# Patient Record
Sex: FEMALE | Race: WHITE | NOT HISPANIC OR LATINO | URBAN - METROPOLITAN AREA
[De-identification: names, ages, dates, MRNs, and addresses within clinical notes are randomized per-mention and may not be internally consistent; named-entity substitution may affect disease eponyms.]

---

## 2023-05-13 ENCOUNTER — HOSPITAL ENCOUNTER (EMERGENCY)
Facility: HOSPITAL | Age: 73
Discharge: HOME | End: 2023-05-13
Attending: EMERGENCY MEDICINE
Payer: MEDICARE

## 2023-05-13 ENCOUNTER — APPOINTMENT (OUTPATIENT)
Dept: CT IMAGING | Facility: HOSPITAL | Age: 73
End: 2023-05-13
Payer: MEDICARE

## 2023-05-13 ENCOUNTER — APPOINTMENT (OUTPATIENT)
Dept: GENERAL RADIOLOGY | Facility: HOSPITAL | Age: 73
End: 2023-05-13
Payer: MEDICARE

## 2023-05-13 VITALS
SYSTOLIC BLOOD PRESSURE: 137 MMHG | WEIGHT: 214 LBS | HEART RATE: 101 BPM | HEIGHT: 64 IN | OXYGEN SATURATION: 95 % | DIASTOLIC BLOOD PRESSURE: 75 MMHG | BODY MASS INDEX: 36.54 KG/M2 | RESPIRATION RATE: 22 BRPM | TEMPERATURE: 98.2 F

## 2023-05-13 DIAGNOSIS — R06.01 ORTHOPNEA: Primary | ICD-10-CM

## 2023-05-13 DIAGNOSIS — R06.02 SHORTNESS OF BREATH: ICD-10-CM

## 2023-05-13 DIAGNOSIS — R05.1 ACUTE COUGH: ICD-10-CM

## 2023-05-13 DIAGNOSIS — I42.0 DILATED CARDIOMYOPATHY: ICD-10-CM

## 2023-05-13 DIAGNOSIS — I44.7 LEFT BUNDLE BRANCH BLOCK (LBBB): ICD-10-CM

## 2023-05-13 DIAGNOSIS — K02.9 DENTAL CARIES: ICD-10-CM

## 2023-05-13 DIAGNOSIS — J32.0 LEFT MAXILLARY SINUSITIS: ICD-10-CM

## 2023-05-13 PROBLEM — J20.9 ACUTE BRONCHITIS: Status: ACTIVE | Noted: 2023-05-13

## 2023-05-13 PROBLEM — I48.92 ATRIAL FLUTTER: Status: ACTIVE | Noted: 2023-05-09

## 2023-05-13 LAB
ALBUMIN SERPL-MCNC: 4.1 G/DL (ref 3.5–5.2)
ALBUMIN/GLOB SERPL: 1.4 G/DL
ALP SERPL-CCNC: 103 U/L (ref 39–117)
ALT SERPL W P-5'-P-CCNC: 19 U/L (ref 1–33)
ANION GAP SERPL CALCULATED.3IONS-SCNC: 13 MMOL/L (ref 5–15)
AST SERPL-CCNC: 22 U/L (ref 1–32)
B PARAPERT DNA SPEC QL NAA+PROBE: NOT DETECTED
B PERT DNA SPEC QL NAA+PROBE: NOT DETECTED
BASOPHILS # BLD AUTO: 0.05 10*3/MM3 (ref 0–0.2)
BASOPHILS NFR BLD AUTO: 0.5 % (ref 0–1.5)
BILIRUB SERPL-MCNC: 0.3 MG/DL (ref 0–1.2)
BUN SERPL-MCNC: 20 MG/DL (ref 8–23)
BUN/CREAT SERPL: 17.4 (ref 7–25)
C PNEUM DNA NPH QL NAA+NON-PROBE: NOT DETECTED
CALCIUM SPEC-SCNC: 9.9 MG/DL (ref 8.6–10.5)
CHLORIDE SERPL-SCNC: 101 MMOL/L (ref 98–107)
CO2 SERPL-SCNC: 25 MMOL/L (ref 22–29)
CREAT SERPL-MCNC: 1.15 MG/DL (ref 0.57–1)
DEPRECATED RDW RBC AUTO: 42.8 FL (ref 37–54)
EGFRCR SERPLBLD CKD-EPI 2021: 51.4 ML/MIN/1.73
EOSINOPHIL # BLD AUTO: 0.2 10*3/MM3 (ref 0–0.4)
EOSINOPHIL NFR BLD AUTO: 1.8 % (ref 0.3–6.2)
ERYTHROCYTE [DISTWIDTH] IN BLOOD BY AUTOMATED COUNT: 13.7 % (ref 12.3–15.4)
FLUAV SUBTYP SPEC NAA+PROBE: NOT DETECTED
FLUBV RNA ISLT QL NAA+PROBE: NOT DETECTED
GLOBULIN UR ELPH-MCNC: 3 GM/DL
GLUCOSE SERPL-MCNC: 203 MG/DL (ref 65–99)
HADV DNA SPEC NAA+PROBE: NOT DETECTED
HCOV 229E RNA SPEC QL NAA+PROBE: NOT DETECTED
HCOV HKU1 RNA SPEC QL NAA+PROBE: NOT DETECTED
HCOV NL63 RNA SPEC QL NAA+PROBE: NOT DETECTED
HCOV OC43 RNA SPEC QL NAA+PROBE: NOT DETECTED
HCT VFR BLD AUTO: 39.7 % (ref 34–46.6)
HGB BLD-MCNC: 12.9 G/DL (ref 12–15.9)
HMPV RNA NPH QL NAA+NON-PROBE: NOT DETECTED
HOLD SPECIMEN: NORMAL
HPIV1 RNA ISLT QL NAA+PROBE: NOT DETECTED
HPIV2 RNA SPEC QL NAA+PROBE: NOT DETECTED
HPIV3 RNA NPH QL NAA+PROBE: NOT DETECTED
HPIV4 P GENE NPH QL NAA+PROBE: NOT DETECTED
IMM GRANULOCYTES # BLD AUTO: 0.06 10*3/MM3 (ref 0–0.05)
IMM GRANULOCYTES NFR BLD AUTO: 0.5 % (ref 0–0.5)
LYMPHOCYTES # BLD AUTO: 1.81 10*3/MM3 (ref 0.7–3.1)
LYMPHOCYTES NFR BLD AUTO: 16.6 % (ref 19.6–45.3)
M PNEUMO IGG SER IA-ACNC: NOT DETECTED
MCH RBC QN AUTO: 28 PG (ref 26.6–33)
MCHC RBC AUTO-ENTMCNC: 32.5 G/DL (ref 31.5–35.7)
MCV RBC AUTO: 86.1 FL (ref 79–97)
MONOCYTES # BLD AUTO: 1 10*3/MM3 (ref 0.1–0.9)
MONOCYTES NFR BLD AUTO: 9.2 % (ref 5–12)
NEUTROPHILS NFR BLD AUTO: 7.79 10*3/MM3 (ref 1.7–7)
NEUTROPHILS NFR BLD AUTO: 71.4 % (ref 42.7–76)
NRBC BLD AUTO-RTO: 0 /100 WBC (ref 0–0.2)
NT-PROBNP SERPL-MCNC: 1407 PG/ML (ref 0–900)
PLATELET # BLD AUTO: 308 10*3/MM3 (ref 140–450)
PMV BLD AUTO: 9.7 FL (ref 6–12)
POTASSIUM SERPL-SCNC: 4.2 MMOL/L (ref 3.5–5.2)
PROT SERPL-MCNC: 7.1 G/DL (ref 6–8.5)
QT INTERVAL: 406 MS
QTC INTERVAL: 533 MS
RBC # BLD AUTO: 4.61 10*6/MM3 (ref 3.77–5.28)
RHINOVIRUS RNA SPEC NAA+PROBE: NOT DETECTED
RSV RNA NPH QL NAA+NON-PROBE: NOT DETECTED
S PYO AG THROAT QL: NEGATIVE
SARS-COV-2 RNA NPH QL NAA+NON-PROBE: NOT DETECTED
SODIUM SERPL-SCNC: 139 MMOL/L (ref 136–145)
TROPONIN T SERPL HS-MCNC: 16 NG/L
TROPONIN T SERPL HS-MCNC: 25 NG/L
WBC NRBC COR # BLD: 10.91 10*3/MM3 (ref 3.4–10.8)
WHOLE BLOOD HOLD COAG: NORMAL
WHOLE BLOOD HOLD SPECIMEN: NORMAL

## 2023-05-13 PROCEDURE — 83880 ASSAY OF NATRIURETIC PEPTIDE: CPT | Performed by: EMERGENCY MEDICINE

## 2023-05-13 PROCEDURE — 36415 COLL VENOUS BLD VENIPUNCTURE: CPT

## 2023-05-13 PROCEDURE — 93005 ELECTROCARDIOGRAM TRACING: CPT

## 2023-05-13 PROCEDURE — 84484 ASSAY OF TROPONIN QUANT: CPT | Performed by: EMERGENCY MEDICINE

## 2023-05-13 PROCEDURE — 0202U NFCT DS 22 TRGT SARS-COV-2: CPT | Performed by: EMERGENCY MEDICINE

## 2023-05-13 PROCEDURE — 87880 STREP A ASSAY W/OPTIC: CPT | Performed by: EMERGENCY MEDICINE

## 2023-05-13 PROCEDURE — 70490 CT SOFT TISSUE NECK W/O DYE: CPT

## 2023-05-13 PROCEDURE — 87081 CULTURE SCREEN ONLY: CPT | Performed by: EMERGENCY MEDICINE

## 2023-05-13 PROCEDURE — 93005 ELECTROCARDIOGRAM TRACING: CPT | Performed by: EMERGENCY MEDICINE

## 2023-05-13 PROCEDURE — 80053 COMPREHEN METABOLIC PANEL: CPT | Performed by: EMERGENCY MEDICINE

## 2023-05-13 PROCEDURE — 99283 EMERGENCY DEPT VISIT LOW MDM: CPT

## 2023-05-13 PROCEDURE — 71045 X-RAY EXAM CHEST 1 VIEW: CPT

## 2023-05-13 PROCEDURE — 85025 COMPLETE CBC W/AUTO DIFF WBC: CPT

## 2023-05-13 RX ORDER — GLIPIZIDE 10 MG/1
10 TABLET, FILM COATED, EXTENDED RELEASE ORAL DAILY
COMMUNITY

## 2023-05-13 RX ORDER — BENZONATATE 200 MG/1
200 CAPSULE ORAL 3 TIMES DAILY PRN
Qty: 30 CAPSULE | Refills: 0 | Status: SHIPPED | OUTPATIENT
Start: 2023-05-13

## 2023-05-13 RX ORDER — LISINOPRIL 20 MG/1
20 TABLET ORAL EVERY 24 HOURS
COMMUNITY
End: 2023-05-13

## 2023-05-13 RX ORDER — SODIUM CHLORIDE 0.9 % (FLUSH) 0.9 %
10 SYRINGE (ML) INJECTION AS NEEDED
Status: DISCONTINUED | OUTPATIENT
Start: 2023-05-13 | End: 2023-05-13 | Stop reason: HOSPADM

## 2023-05-13 RX ORDER — BENZONATATE 100 MG/1
200 CAPSULE ORAL ONCE
Status: COMPLETED | OUTPATIENT
Start: 2023-05-13 | End: 2023-05-13

## 2023-05-13 RX ORDER — CEPHALEXIN 500 MG/1
500 CAPSULE ORAL 4 TIMES DAILY
Qty: 40 CAPSULE | Refills: 0 | Status: SHIPPED | OUTPATIENT
Start: 2023-05-13

## 2023-05-13 RX ORDER — SPIRONOLACTONE 25 MG/1
25 TABLET ORAL DAILY
COMMUNITY

## 2023-05-13 RX ORDER — ATORVASTATIN CALCIUM 10 MG/1
10 TABLET, FILM COATED ORAL DAILY
COMMUNITY

## 2023-05-13 RX ORDER — METFORMIN HYDROCHLORIDE 500 MG/1
2 TABLET, EXTENDED RELEASE ORAL 2 TIMES DAILY
COMMUNITY
End: 2023-05-13

## 2023-05-13 RX ORDER — LISINOPRIL 20 MG/1
20 TABLET ORAL DAILY
COMMUNITY

## 2023-05-13 RX ORDER — CEPHALEXIN 250 MG/1
500 CAPSULE ORAL ONCE
Status: COMPLETED | OUTPATIENT
Start: 2023-05-13 | End: 2023-05-13

## 2023-05-13 RX ORDER — CARVEDILOL 3.12 MG/1
3.12 TABLET ORAL 2 TIMES DAILY WITH MEALS
COMMUNITY

## 2023-05-13 RX ADMIN — CEPHALEXIN 500 MG: 250 CAPSULE ORAL at 05:30

## 2023-05-13 RX ADMIN — BENZONATATE 200 MG: 100 CAPSULE ORAL at 05:30

## 2023-05-13 NOTE — DISCHARGE INSTRUCTIONS
Monitor your oxygen saturation at home, and return to the ER if it is 88% or less.  Continue your usual medications as prescribed.  Follow-up with your dentist, as your left upper third molar may require extraction for infection.  CT today showed:    EXAMINATION: CT SOFT TISSUE NECK WO CONTRAST     DATE: 5/13/2023 2:10 AM     INDICATION: Episodic stridor and sensation of obstruction.     COMPARISON: None available.     TECHNIQUE: Axial noncontrast CT images were obtained through the neck.  Coronal and sagittal reformatted images were created.  There were no immediate complications.  CT dose lowering techniques were used, to include: automated exposure control,   adjustment for patient size, and or use of iterative reconstruction.     FINDINGS:     AERODIGESTIVE TRACT: No mass lesion or fluid collection along the aerodigestive tract. No retropharyngeal abscess. The airway is patent.     LYMPH NODES: No enlarged or suspicious cervical lymph nodes.     PAROTID GLANDS: Normal     SUBMANDIBULAR GLANDS: Normal     THYROID GLAND: Normal     VISUALIZED BRAIN: No acute abnormality in the imaged portions of the brain. Chronic infarct in the right basal ganglia/corona radiata.     VISUALIZED ORBITS: Unremarkable.     VASCULAR: Not all assessed in the absence of IV contrast.     LUNGS: Lung apices are clear.     VISUALIZED PARANASAL SINUSES: Opacification of the left maxillary sinus. Mild mucosal thickening in the ethmoid air cells.     VISUALIZED MASTOID AIR CELLS: Well aerated     BONES: No acute osseous abnormality or suspicious osseous lesion. Mild degenerative changes in the cervical spine. Dental disease. Lucency around the left third maxillary molar with root with extension to the left maxillary sinus.     SOFT TISSUES: No other acute findings within the neck soft tissues. No fluid collection or mass lesion.        IMPRESSION:  1. No acute abnormality within the neck.  2. Left maxillary sinus disease. Lucency around the  root of the left third maxillary molar with erosion into the inferior aspect of the maxillary sinus suggestive of odontogenic sinusitis.     Electronically signed by:  Jacobo Ambriz M.D.    5/13/2023 12:34 AM Mountain Time      Follow-up with your cardiologist in 3 days as scheduled. Today, your HSTrop was 16, then 25 on recheck. It was 17.5 on review of prior records on 12/20/22. ProBNP blood test for the heart was 1407 today, and no prior value is available for comparison. Chest x-ray was normal per radiologist.      You may have had episodes of laryngospasm, which can be triggered by acid reflux or post-nasal drip, or forceful cough.

## 2023-05-13 NOTE — ED PROVIDER NOTES
"Subjective   History of Present Illness  72 year old female presents with intermittent episodes of difficulty breathing. She has had a productive cough with intermittent chest tightness for approximately 5 days without recent fever. Last night she awoke from sleep gasping for air, then was able to catch her breath. Tonight she tried sleeping propped up on 3 pillows to avoid a recurrent episode. Despite this, she reports about 5 recurrent episodes between 9 PM and 11 PM this evening. Each episode lasts a short duration. She describes it \"as if something is obstructing my windpipe. It's like as if I'm trying to breathe through a straw, and the straw gets kinked.\" Then that sensation abates and she gasps for air and recovers. No loss of consciousness reported. She has not had a sleep study per pt. She is anticoagulated on Eliquis for atrial flutter. Saw her cardiologist Dr. Storm last week and has an ablation procedure scheduled for Tuesday, May 16th. She never smoked, denies history of asthma, does not use any inhalers. She denies recent cardiac stress test or catheterization, but denies known history of CAD, MI, or coronary stenting.         Review of Systems   Constitutional: Negative for fever.   HENT: Positive for dental problem.         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 Denies recent facial pain or swelling.   Respiratory: Positive for cough and shortness of breath.        Past Medical History:   Diagnosis Date   • Atrial flutter by electrocardiogram    • CHF (congestive heart failure)    • Chronic kidney disease    • Diabetes mellitus    Left bundle branch block, dilated cardiomyopathy    Allergies   Allergen Reactions   • Penicillins Rash       History reviewed. No pertinent surgical history.    History reviewed. No pertinent family history.    Social History     Socioeconomic History   • " Marital status:    Tobacco Use   • Smoking status: Never           Objective   Physical Exam  Vitals and nursing note reviewed.   Constitutional:       General: She is not in acute distress.     Appearance: She is not diaphoretic.      Comments: Excellent historian. Appears younger than stated age.   HENT:      Mouth/Throat:      Comments: Moist mucosa without pallor. No trismus. Left upper 3rd molar has grossly visible caries, nontender to palpation. No gingival mass or drainage. Nontender maxillary and frontal sinuses bilaterally. Mild erythema to oropharynx without tonsillar exudate. Uvula midline without edema.   Neck:      Comments: No mass or meningismus.  Cardiovascular:      Rate and Rhythm: Normal rate and regular rhythm.      Heart sounds: No murmur heard.    No friction rub. No gallop.      Comments: S1, S2. Not tachycardic on my exam.  Pulmonary:      Effort: Pulmonary effort is normal. No tachypnea or respiratory distress.      Breath sounds: Normal breath sounds. No stridor.      Comments: Good air entry. No rales or wheezing appreciated. No prolonged expiratory phase.  Musculoskeletal:      Cervical back: Neck supple.      Comments: No significant peripheral edema.    Skin:     General: Skin is warm and dry.      Coloration: Skin is not cyanotic.   Neurological:      Comments: Normal speech, no dysarthria, no facial droop.         Procedures           ED Course  ED Course as of 05/13/23 1805   Sat May 13, 2023   0500 Prior to discharge, results and plan discussed with the patient and her spouse at bedside. We discussed the troponin values, compared to baseline value of 17 previously, in the setting of the quality of her presenting symptoms, CKD history, and known dilated cardiomyopathy history, seem clinically inconsistent with acute coronary syndrome. Most likely an intermittent laryngospasm or mucus plug. Pt was observed in the emergency department for several hours and did not have any  further episodes as she described. CT neck and chest x-ray results discussed with the patient. Will treat for odontogenic sinusitis and recommend follow up with cardiology as scheduled on 5/16, dentist, and PCP. She has a portable pulse oximeter at home to monitor her oxygen saturation and return precautions and parameters discussed prior to discharge. The patient and spouse verbally express understanding and are in agreement with the plan. [LD]      ED Course User Index  [LD] Marli Schrader MD                                           Medical Decision Making  What she describes sounds most like intermittent airway obstruction, which could be seen with episodes of laryngospasm, less likely foreign body or mucus plug, less likely retropharyngeal abscess or mass as episodes are intermittent and not currently present. Differential diagnosis includes less likely pneumonia, sinusitis, viral syndrome, or exacerbation of congestive heart failure.     Acute cough: complicated acute illness or injury  Dental caries: complicated acute illness or injury  Left bundle branch block (LBBB): complicated acute illness or injury  Left maxillary sinusitis: complicated acute illness or injury  Orthopnea: complicated acute illness or injury  Shortness of breath: complicated acute illness or injury  Amount and/or Complexity of Data Reviewed  Independent Historian:      Details: significant other at bedside  External Data Reviewed: labs.     Details: prior troponin 17.5 12/20/22, and prior notes from cardiologist Dr. Storm reviewed, indicating prior left bundle branch block and dilated cardiomyopathy, atrial flutter.   Labs: ordered. Decision-making details documented in ED Course.  Radiology: ordered. Decision-making details documented in ED Course.  ECG/medicine tests: ordered and independent interpretation performed. Decision-making details documented in ED Course.     Details: EKG at 00:14 shows sinus tachycardia at 104 beats per  minute, left bundle branch block. No prior EKG is available for comparison, but review of prior notes report prior left bundle branch block.      Risk  Prescription drug management.        Recent Results (from the past 24 hour(s))   ECG 12 Lead ED Triage Standing Order; SOA    Collection Time: 05/13/23 12:14 AM   Result Value Ref Range    QT Interval 406 ms    QTC Interval 533 ms   Comprehensive Metabolic Panel    Collection Time: 05/13/23  1:11 AM    Specimen: Blood   Result Value Ref Range    Glucose 203 (H) 65 - 99 mg/dL    BUN 20 8 - 23 mg/dL    Creatinine 1.15 (H) 0.57 - 1.00 mg/dL    Sodium 139 136 - 145 mmol/L    Potassium 4.2 3.5 - 5.2 mmol/L    Chloride 101 98 - 107 mmol/L    CO2 25.0 22.0 - 29.0 mmol/L    Calcium 9.9 8.6 - 10.5 mg/dL    Total Protein 7.1 6.0 - 8.5 g/dL    Albumin 4.1 3.5 - 5.2 g/dL    ALT (SGPT) 19 1 - 33 U/L    AST (SGOT) 22 1 - 32 U/L    Alkaline Phosphatase 103 39 - 117 U/L    Total Bilirubin 0.3 0.0 - 1.2 mg/dL    Globulin 3.0 gm/dL    A/G Ratio 1.4 g/dL    BUN/Creatinine Ratio 17.4 7.0 - 25.0    Anion Gap 13.0 5.0 - 15.0 mmol/L    eGFR 51.4 (L) >60.0 mL/min/1.73   BNP    Collection Time: 05/13/23  1:11 AM    Specimen: Blood   Result Value Ref Range    proBNP 1,407.0 (H) 0.0 - 900.0 pg/mL   Single High Sensitivity Troponin T    Collection Time: 05/13/23  1:11 AM    Specimen: Blood   Result Value Ref Range    HS Troponin T 16 (H) <10 ng/L   Green Top (Gel)    Collection Time: 05/13/23  1:11 AM   Result Value Ref Range    Extra Tube Hold for add-ons.    Lavender Top    Collection Time: 05/13/23  1:11 AM   Result Value Ref Range    Extra Tube hold for add-on    Gold Top - SST    Collection Time: 05/13/23  1:11 AM   Result Value Ref Range    Extra Tube Hold for add-ons.    Gray Top    Collection Time: 05/13/23  1:11 AM   Result Value Ref Range    Extra Tube Hold for add-ons.    Light Blue Top    Collection Time: 05/13/23  1:11 AM   Result Value Ref Range    Extra Tube Hold for add-ons.     CBC Auto Differential    Collection Time: 05/13/23  1:11 AM    Specimen: Blood   Result Value Ref Range    WBC 10.91 (H) 3.40 - 10.80 10*3/mm3    RBC 4.61 3.77 - 5.28 10*6/mm3    Hemoglobin 12.9 12.0 - 15.9 g/dL    Hematocrit 39.7 34.0 - 46.6 %    MCV 86.1 79.0 - 97.0 fL    MCH 28.0 26.6 - 33.0 pg    MCHC 32.5 31.5 - 35.7 g/dL    RDW 13.7 12.3 - 15.4 %    RDW-SD 42.8 37.0 - 54.0 fl    MPV 9.7 6.0 - 12.0 fL    Platelets 308 140 - 450 10*3/mm3    Neutrophil % 71.4 42.7 - 76.0 %    Lymphocyte % 16.6 (L) 19.6 - 45.3 %    Monocyte % 9.2 5.0 - 12.0 %    Eosinophil % 1.8 0.3 - 6.2 %    Basophil % 0.5 0.0 - 1.5 %    Immature Grans % 0.5 0.0 - 0.5 %    Neutrophils, Absolute 7.79 (H) 1.70 - 7.00 10*3/mm3    Lymphocytes, Absolute 1.81 0.70 - 3.10 10*3/mm3    Monocytes, Absolute 1.00 (H) 0.10 - 0.90 10*3/mm3    Eosinophils, Absolute 0.20 0.00 - 0.40 10*3/mm3    Basophils, Absolute 0.05 0.00 - 0.20 10*3/mm3    Immature Grans, Absolute 0.06 (H) 0.00 - 0.05 10*3/mm3    nRBC 0.0 0.0 - 0.2 /100 WBC   Rapid Strep A Screen - Swab, Throat    Collection Time: 05/13/23  2:52 AM    Specimen: Throat; Swab   Result Value Ref Range    Strep A Ag Negative Negative   Respiratory Panel PCR w/COVID-19(SARS-CoV-2) [facility] In-House, NP Swab in UTM/VTM, 3-4 HR TAT - Swab, Nasopharynx    Collection Time: 05/13/23  2:52 AM    Specimen: Nasopharynx; Swab   Result Value Ref Range    ADENOVIRUS, PCR Not Detected Not Detected    Coronavirus 229E Not Detected Not Detected    Coronavirus HKU1 Not Detected Not Detected    Coronavirus NL63 Not Detected Not Detected    Coronavirus OC43 Not Detected Not Detected    COVID19 Not Detected Not Detected - Ref. Range    Human Metapneumovirus Not Detected Not Detected    Human Rhinovirus/Enterovirus Not Detected Not Detected    Influenza A PCR Not Detected Not Detected    Influenza B PCR Not Detected Not Detected    Parainfluenza Virus 1 Not Detected Not Detected    Parainfluenza Virus 2 Not  "Detected Not Detected    Parainfluenza Virus 3 Not Detected Not Detected    Parainfluenza Virus 4 Not Detected Not Detected    RSV, PCR Not Detected Not Detected    Bordetella pertussis pcr Not Detected Not Detected    Bordetella parapertussis PCR Not Detected Not Detected    Chlamydophila pneumoniae PCR Not Detected Not Detected    Mycoplasma pneumo by PCR Not Detected Not Detected   Single High Sensitivity Troponin T    Collection Time: 05/13/23  3:28 AM    Specimen: Blood   Result Value Ref Range    HS Troponin T 25 (H) <10 ng/L     Note: In addition to lab results from this visit, the labs listed above may include labs taken at another facility or during a different encounter within the last 24 hours. Please correlate lab times with ED admission and discharge times for further clarification of the services performed during this visit.    CT Soft Tissue Neck Without Contrast   Final Result   1. No acute abnormality within the neck.   2. Left maxillary sinus disease. Lucency around the root of the left third maxillary molar with erosion into the inferior aspect of the maxillary sinus suggestive of odontogenic sinusitis.      Electronically signed by:  Jacobo Ambriz M.D.     5/13/2023 12:34 AM Mountain Time      XR Chest 1 View   Final Result   No acute cardiopulmonary abnormality.      Electronically signed by:  Damián Amos M.D.     5/12/2023 11:26 PM Mountain Time        Vitals:    05/13/23 0004 05/13/23 0030   BP: 144/73 137/75   BP Location: Left arm    Patient Position: Sitting    Pulse: 110 101   Resp: 22    Temp: 98.2 °F (36.8 °C)    TempSrc: Oral    SpO2: 96% 95%   Weight: 97.1 kg (214 lb)    Height: 162.6 cm (64\")      Medications   cephalexin (KEFLEX) capsule 500 mg (500 mg Oral Given 5/13/23 0530)   benzonatate (TESSALON) capsule 200 mg (200 mg Oral Given 5/13/23 0530)     ECG/EMG Results (last 24 hours)     Procedure Component Value Units Date/Time    ECG 12 Lead ED Triage Standing Order; SOA " [[id number]] Collected: 05/13/23 0014     Updated: 05/13/23 0015     QT Interval 406 ms      QTC Interval 533 ms     Narrative:      Test Reason : ED Triage Standing Order~  Blood Pressure :   */*   mmHG  Vent. Rate : 104 BPM     Atrial Rate : 104 BPM     P-R Int : 150 ms          QRS Dur : 172 ms      QT Int : 406 ms       P-R-T Axes :  55 -27 103 degrees     QTc Int : 533 ms    Sinus tachycardia with fusion complexes  Left bundle branch block  Abnormal ECG  No previous ECGs available    Referred By: EDMD           Confirmed By:         ECG 12 Lead ED Triage Standing Order; SOA   Preliminary Result   Test Reason : ED Triage Standing Order~   Blood Pressure :   */*   mmHG   Vent. Rate : 104 BPM     Atrial Rate : 104 BPM      P-R Int : 150 ms          QRS Dur : 172 ms       QT Int : 406 ms       P-R-T Axes :  55 -27 103 degrees      QTc Int : 533 ms      Sinus tachycardia with fusion complexes   Left bundle branch block   Abnormal ECG   No previous ECGs available      Referred By: EDMD           Confirmed By:             Final diagnoses:   Orthopnea   Left maxillary sinusitis   Dental caries   Shortness of breath   Left bundle branch block (LBBB)   Acute cough   Dilated cardiomyopathy       ED Disposition  ED Disposition     ED Disposition   Discharge    Condition   Stable    Comment   --             Karel Polk MD  [address] [place] [id number] [phone]    Schedule an appointment as soon as possible for a visit in 1 week      Peter Storm MD  [address] [place] [id number] [phone]      Follow up with your cardiologist in 3 days as scheduled    your dentist    Schedule an appointment as soon as possible for a visit in 1 week  It appears that the left upper 3rd molar tooth is infected and will likely require extraction per CT report today         Medication List      New Prescriptions    benzonatate 200 MG capsule  Commonly known as: TESSALON  Take 1 capsule by mouth 3 (Three)  Times a Day As Needed for Cough.     cephalexin 500 MG capsule  Commonly known as: KEFLEX  Take 1 capsule by mouth 4 (Four) Times a Day.        Changed    lisinopril 20 MG tablet  Commonly known as: PRINIVIL,ZESTRIL  What changed: Another medication with the same name was removed. Continue taking this medication, and follow the directions you see here.     metFORMIN 1000 MG tablet  Commonly known as: GLUCOPHAGE  What changed: Another medication with the same name was removed. Continue taking this medication, and follow the directions you see here.           Where to Get Your Medications      These medications were sent to [id number] - [place], [place] - [address] [place] AT [address] [facility] - [phone] [facility] [phone] [address] [place], [facility] [address]    Phone: [phone]   · benzonatate 200 MG capsule  · cephalexin 500 MG capsule          Marli Schrader MD  05/13/23 9291

## 2023-05-15 LAB — BACTERIA SPEC AEROBE CULT: NORMAL

## 2023-05-17 LAB
QT INTERVAL: 406 MS
QTC INTERVAL: 533 MS